# Patient Record
Sex: FEMALE | Race: WHITE | NOT HISPANIC OR LATINO | Employment: STUDENT | URBAN - METROPOLITAN AREA
[De-identification: names, ages, dates, MRNs, and addresses within clinical notes are randomized per-mention and may not be internally consistent; named-entity substitution may affect disease eponyms.]

---
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
## 2023-03-21 DIAGNOSIS — N93.9 ABNORMAL UTERINE BLEEDING (AUB): Primary | ICD-10-CM

## 2023-03-21 RX ORDER — ESCITALOPRAM OXALATE 10 MG/1
10 TABLET ORAL
COMMUNITY
Start: 2023-01-14 | End: 2023-06-15

## 2023-03-21 RX ORDER — FLUOXETINE 10 MG/1
10 CAPSULE ORAL
COMMUNITY
Start: 2022-12-30 | End: 2023-06-15

## 2023-03-21 RX ORDER — NORGESTIMATE AND ETHINYL ESTRADIOL 0.25-0.035
1 KIT ORAL DAILY
Qty: 28 TABLET | Refills: 1 | Status: SHIPPED | OUTPATIENT
Start: 2023-03-21 | End: 2023-06-15 | Stop reason: SDUPTHER

## 2023-03-21 NOTE — TELEPHONE ENCOUNTER
----- Message from Maricruz Altman sent at 3/21/2023  9:16 AM CDT -----  Contact: PT  Type: Needs Medical Advice    Who Called: Zoie/ [facility]  Best Call Back Number: [phone]  Additional  Information: PT requesting a call back to find out why RX SPRINTEC, 28, 0.25-35 mg-mcg per tablet. PT  said pharmacy told her refill was denied by provider. PT is completely out of Medication  Please Advise- Thank you

## 2023-03-21 NOTE — TELEPHONE ENCOUNTER
----- Message from [facility] sent at 3/20/2023  8:39 AM CDT -----  Type:  Needs Medical Advice    Who Called: Pt   Would the patient rather a call back or a response via [facility]? Callback   Best Call Back Number: [phone]  Additional Information: Pt requesting callback from office to discuss issues with prescription

## 2023-03-22 ENCOUNTER — TELEPHONE (OUTPATIENT)
Dept: OBSTETRICS AND GYNECOLOGY | Facility: CLINIC | Age: 18
End: 2023-03-22
Payer: MEDICAID

## 2023-03-27 ENCOUNTER — TELEPHONE (OUTPATIENT)
Dept: OBSTETRICS AND GYNECOLOGY | Facility: CLINIC | Age: 18
End: 2023-03-27
Payer: MEDICAID

## 2023-03-27 NOTE — TELEPHONE ENCOUNTER
----- Message from Mera Raymond sent at 3/27/2023  1:07 PM CDT -----  Type:  Sooner Apoointment Request    Caller is requesting a sooner appointment.  Caller declined first available appointment listed below.  Caller will not accept being placed on the waitlist and is requesting a message be sent to doctor.  Name of Caller:Pt  When is the first available appointment?none    Symptoms:Annual and RX refill    Would the patient rather a call back or a response via [facility]? Call back    Best Call Back Number:[phone]    Additional Information: Sts she needs a call back at this number listed no other to get a sooner appt for her Annual so she can get her refills.  Please advise -- Thank you        
S/w patient. 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  
yes

## 2023-06-15 ENCOUNTER — OFFICE VISIT (OUTPATIENT)
Dept: OBSTETRICS AND GYNECOLOGY | Facility: CLINIC | Age: 18
End: 2023-06-15
Payer: MEDICAID

## 2023-06-15 VITALS — WEIGHT: 139.13 LBS | DIASTOLIC BLOOD PRESSURE: 60 MMHG | SYSTOLIC BLOOD PRESSURE: 108 MMHG

## 2023-06-15 DIAGNOSIS — N93.9 ABNORMAL UTERINE BLEEDING (AUB): ICD-10-CM

## 2023-06-15 DIAGNOSIS — Z30.019 ENCOUNTER FOR FEMALE BIRTH CONTROL: Primary | ICD-10-CM

## 2023-06-15 LAB
B-HCG UR QL: NEGATIVE
CTP QC/QA: YES

## 2023-06-15 PROCEDURE — 3074F SYST BP LT 130 MM HG: CPT | Mod: CPTII,,, | Performed by: OBSTETRICS & GYNECOLOGY

## 2023-06-15 PROCEDURE — 99213 OFFICE O/P EST LOW 20 MIN: CPT | Mod: S$PBB,,, | Performed by: OBSTETRICS & GYNECOLOGY

## 2023-06-15 PROCEDURE — 1160F RVW MEDS BY RX/DR IN RCRD: CPT | Mod: CPTII,,, | Performed by: OBSTETRICS & GYNECOLOGY

## 2023-06-15 PROCEDURE — 3074F PR MOST RECENT SYSTOLIC BLOOD PRESSURE < 130 MM HG: ICD-10-PCS | Mod: CPTII,,, | Performed by: OBSTETRICS & GYNECOLOGY

## 2023-06-15 PROCEDURE — 1160F PR REVIEW ALL MEDS BY PRESCRIBER/CLIN PHARMACIST DOCUMENTED: ICD-10-PCS | Mod: CPTII,,, | Performed by: OBSTETRICS & GYNECOLOGY

## 2023-06-15 PROCEDURE — 3078F PR MOST RECENT DIASTOLIC BLOOD PRESSURE < 80 MM HG: ICD-10-PCS | Mod: CPTII,,, | Performed by: OBSTETRICS & GYNECOLOGY

## 2023-06-15 PROCEDURE — 99999 PR PBB SHADOW E&M-EST. PATIENT-LVL III: CPT | Mod: PBBFAC,,, | Performed by: OBSTETRICS & GYNECOLOGY

## 2023-06-15 PROCEDURE — 3078F DIAST BP <80 MM HG: CPT | Mod: CPTII,,, | Performed by: OBSTETRICS & GYNECOLOGY

## 2023-06-15 PROCEDURE — 99213 PR OFFICE/OUTPT VISIT, EST, LEVL III, 20-29 MIN: ICD-10-PCS | Mod: S$PBB,,, | Performed by: OBSTETRICS & GYNECOLOGY

## 2023-06-15 PROCEDURE — 1159F PR MEDICATION LIST DOCUMENTED IN MEDICAL RECORD: ICD-10-PCS | Mod: CPTII,,, | Performed by: OBSTETRICS & GYNECOLOGY

## 2023-06-15 PROCEDURE — 99999 PR PBB SHADOW E&M-EST. PATIENT-LVL III: ICD-10-PCS | Mod: PBBFAC,,, | Performed by: OBSTETRICS & GYNECOLOGY

## 2023-06-15 PROCEDURE — 81025 URINE PREGNANCY TEST: CPT | Mod: PBBFAC,PN | Performed by: OBSTETRICS & GYNECOLOGY

## 2023-06-15 PROCEDURE — 99213 OFFICE O/P EST LOW 20 MIN: CPT | Mod: PBBFAC,PN | Performed by: OBSTETRICS & GYNECOLOGY

## 2023-06-15 PROCEDURE — 1159F MED LIST DOCD IN RCRD: CPT | Mod: CPTII,,, | Performed by: OBSTETRICS & GYNECOLOGY

## 2023-06-15 RX ORDER — NORGESTIMATE AND ETHINYL ESTRADIOL 0.25-0.035
1 KIT ORAL DAILY
Qty: 84 TABLET | Refills: 3 | Status: SHIPPED | OUTPATIENT
Start: 2023-06-15

## 2023-06-15 RX ORDER — ALBUTEROL SULFATE 90 UG/1
AEROSOL, METERED RESPIRATORY (INHALATION)
COMMUNITY
Start: 2023-05-06

## 2023-06-15 NOTE — PROGRESS NOTES
Chief Complaint   Patient presents with    Contraception     Patient would like a refill of birth control; last prescription was 3 months ago       History of Present Illness   18 y.o. F patient presents today for contraception    Menarche 12  Cycles monthly, lasting 4-5 days, normal, using about 4 pads per day, & no pain  Current Contraception: cocp, needs refill  Prior Contraception: continuous cocp, stopped due to irregular bleeding   Sexually Active: yes  Patient's last menstrual period was 05/12/2023.       Past medical and surgical history reviewed.   I have reviewed the patient's medical history in detail and updated the computerized patient record.  Review of patient's allergies indicates:  No Known Allergies    Review of Systems  Constitutional: negative for chills and fatigue  Gastrointestinal: negative for abdominal pain, constipation, diarrhea, nausea and vomiting  Genitourinary:negative for abnormal menstrual periods, genital lesions and vaginal discharge, dysuria, frequency and hematuria      Physical Examination:  /60   Wt 63.1 kg (139 lb 1.8 oz)   LMP 05/12/2023    Physical Exam:   Constitutional: She appears well-developed and well-nourished. No distress.             Abdominal: Soft. There is no abdominal tenderness.                     Assessment/Plan:  Encounter for female birth control  -     POCT urine pregnancy  -     norgestimate-ethinyl estradioL (SPRINTEC, 28,) 0.25-35 mg-mcg per tablet; Take 1 tablet by mouth once daily.  Dispense: 84 tablet; Refill: 3    Abnormal uterine bleeding (AUB)

## 2024-07-03 ENCOUNTER — OFFICE VISIT (OUTPATIENT)
Dept: OBSTETRICS AND GYNECOLOGY | Facility: CLINIC | Age: 19
End: 2024-07-03
Payer: COMMERCIAL

## 2024-07-03 VITALS
HEIGHT: 63 IN | SYSTOLIC BLOOD PRESSURE: 122 MMHG | DIASTOLIC BLOOD PRESSURE: 70 MMHG | BODY MASS INDEX: 24.14 KG/M2 | WEIGHT: 136.25 LBS

## 2024-07-03 DIAGNOSIS — Z12.4 ENCOUNTER FOR PAPANICOLAOU SMEAR FOR CERVICAL CANCER SCREENING: Primary | ICD-10-CM

## 2024-07-03 DIAGNOSIS — Z30.019 ENCOUNTER FOR FEMALE BIRTH CONTROL: ICD-10-CM

## 2024-07-03 PROCEDURE — 99999 PR PBB SHADOW E&M-EST. PATIENT-LVL III: CPT | Mod: PBBFAC,,,

## 2024-07-03 RX ORDER — MEDROXYPROGESTERONE ACETATE 150 MG/ML
150 INJECTION, SUSPENSION INTRAMUSCULAR
Status: COMPLETED | OUTPATIENT
Start: 2024-07-03 | End: 2024-07-03

## 2024-07-03 RX ORDER — LISDEXAMFETAMINE DIMESYLATE 40 MG/1
CAPSULE ORAL
COMMUNITY

## 2024-07-03 RX ADMIN — MEDROXYPROGESTERONE ACETATE 150 MG: 150 INJECTION, SUSPENSION INTRAMUSCULAR at 03:07

## 2024-07-03 NOTE — PROGRESS NOTES
HISTORY OF PRESENT ILLNESS:    Zoie Brown is a 19 y.o. female, , Patient's last menstrual period was 2024.,  presents for a routine annual exam .   Last pap:  never, first today   Last mammogram: n/a   Last colon ca screening:  n/a   SA: male partner, does not use condoms  Contraception: OCP (estrogen/progesterone). Desires to switch to DMPA, counseled. Will give first injection today and follow up in 3 months.    Sexually transmitted infection risk: very low risk of STD exposure. Declines screening/   Menstrual flow: regular every 28-30 days.  This is the extent of the patient's complaints at this time.     Past Medical History:   Diagnosis Date    Anxiety disorder, unspecified        History reviewed. No pertinent surgical history.    MEDICATIONS AND ALLERGIES:      Current Outpatient Medications:     lisdexamfetamine (VYVANSE) 40 MG Cap, , Disp: , Rfl:     albuterol (PROVENTIL/VENTOLIN HFA) 90 mcg/actuation inhaler, Inhale into the lungs. (Patient not taking: Reported on 7/3/2024), Disp: , Rfl:   No current facility-administered medications for this visit.    Review of patient's allergies indicates:  No Known Allergies    Family History   Problem Relation Name Age of Onset    Breast cancer Neg Hx      Ovarian cancer Neg Hx         Social History     Socioeconomic History    Marital status: Single   Tobacco Use    Smoking status: Never   Substance and Sexual Activity    Alcohol use: Yes     Comment: socially    Drug use: Not Currently    Sexual activity: Yes     Partners: Male     Birth control/protection: OCP       OB History    Para Term  AB Living   0 0 0 0 0 0   SAB IAB Ectopic Multiple Live Births   0 0 0 0 0          COMPREHENSIVE GYN HISTORY:  PAP History: Denies abnormal Paps.  Infection History: Denies STDs. Denies PID.  Benign History: Denies uterine fibroids. Denies ovarian cysts. Denies endometriosis. Denies other conditions.  Cancer History: Denies cervical cancer.  "Denies uterine cancer or hyperplasia. Denies ovarian cancer. Denies vulvar cancer or pre-cancer. Denies vaginal cancer or pre-cancer. Denies breast cancer. Denies colon cancer.      ROS:  GENERAL: No weight changes. No swelling. No fatigue. No fever.  BREASTS: No pain. No lumps. No discharge.  ABDOMEN: No pain. No nausea. No vomiting. No diarrhea. No constipation.  REPRODUCTIVE: No abnormal bleeding. No pelvic pain.   VULVA: No pain. No lesions. No itching.  VAGINA: No relaxation. No itching. No odor. No discharge. No lesions.  URINARY: No incontinence. No nocturia. No frequency. No dysuria.    /70   Ht 5' 3" (1.6 m)   Wt 61.8 kg (136 lb 3.9 oz)   LMP 06/09/2024   BMI 24.13 kg/m²     PE:  Physical Exam:   Constitutional: She is oriented to person, place, and time. She appears well-developed and well-nourished. No distress.    HENT:   Head: Normocephalic.    Eyes: Pupils are equal, round, and reactive to light.      Pulmonary/Chest: Effort normal. No respiratory distress.        Abdominal: Soft. She exhibits no distension. There is no abdominal tenderness.     Genitourinary:    Inguinal canal, vagina, uterus, right adnexa and left adnexa normal.      Pelvic exam was performed with patient supine.   The external female genitalia was normal.     Labial bartholins normal.There is no rash, tenderness or lesion on the right labia. There is no rash, tenderness or lesion on the left labia. Cervix is normal. Right adnexum displays no mass, no tenderness and no fullness. Left adnexum displays no mass, no tenderness and no fullness. Vagina exhibits no lesion. No erythema, vaginal discharge, tenderness or bleeding in the vagina.    No signs of injury in the vagina.   Cervix exhibits no motion tenderness, no lesion, no discharge, no friability, no tenderness and no polyp.    pap smear completedUterus consistancy normal and Uerus contour normal  Uterus is not enlarged and not tender.           Musculoskeletal: Normal " range of motion and moves all extremeties.       Neurological: She is alert and oriented to person, place, and time.    Skin: Skin is warm and dry.    Psychiatric: She has a normal mood and affect. Judgment and thought content normal.      PROCEDURES/ORDERS:  Pap      Assessment/Plan:    Encounter for Papanicolaou smear for cervical cancer screening  -     Liquid-Based Pap Smear, Screening    Encounter for female birth control  -     medroxyPROGESTERone (DEPO-PROVERA) injection 150 mg        COUNSELING:  The patient was counseled today on:  -A.C.S. Pap and pelvic exam guidelines, recomendations for yearly mammogram, monthly self breast exams and to follow up with her PCP for other health maintenance.    FOLLOW-UP  in 3 months for next DMPA and annually for WWE

## 2024-08-31 DIAGNOSIS — Z30.019 ENCOUNTER FOR FEMALE BIRTH CONTROL: ICD-10-CM

## 2024-08-31 RX ORDER — NORGESTIMATE AND ETHINYL ESTRADIOL 0.25-0.035
1 KIT ORAL
Qty: 84 TABLET | Refills: 3 | OUTPATIENT
Start: 2024-08-31

## 2024-08-31 NOTE — TELEPHONE ENCOUNTER
Refill Decision Note   Zoie Brown  is requesting a refill authorization.  Brief Assessment and Rationale for Refill:  Quick Discontinue     Medication Therapy Plan:  discontinued on 7/3/2024 by Marli Ren LPN      Comments:     Note composed:8:47 AM 08/31/2024

## 2024-09-19 ENCOUNTER — PATIENT MESSAGE (OUTPATIENT)
Dept: OBSTETRICS AND GYNECOLOGY | Facility: CLINIC | Age: 19
End: 2024-09-19
Payer: COMMERCIAL

## 2024-09-25 ENCOUNTER — CLINICAL SUPPORT (OUTPATIENT)
Dept: OBSTETRICS AND GYNECOLOGY | Facility: CLINIC | Age: 19
End: 2024-09-25
Payer: COMMERCIAL

## 2024-09-25 DIAGNOSIS — Z30.9 ENCOUNTER FOR CONTRACEPTIVE MANAGEMENT, UNSPECIFIED TYPE: Primary | ICD-10-CM

## 2024-09-25 PROCEDURE — 99999 PR PBB SHADOW E&M-EST. PATIENT-LVL I: CPT | Mod: PBBFAC,,,

## 2024-09-25 PROCEDURE — 96372 THER/PROPH/DIAG INJ SC/IM: CPT | Mod: S$GLB,,, | Performed by: OBSTETRICS & GYNECOLOGY

## 2024-09-25 RX ORDER — MEDROXYPROGESTERONE ACETATE 150 MG/ML
150 INJECTION, SUSPENSION INTRAMUSCULAR
Status: COMPLETED | OUTPATIENT
Start: 2024-09-25 | End: 2024-09-25

## 2024-09-25 RX ADMIN — MEDROXYPROGESTERONE ACETATE 150 MG: 150 INJECTION, SUSPENSION INTRAMUSCULAR at 01:09

## 2024-09-25 NOTE — PROGRESS NOTES
After obtaining consent, and per orders of Dr. Blakely injection of Depo-Provera Lot [id number] Exp 12/31/2026 LUOQG by Sharon Srinivasan MA. Patient tolerated well and band aid applied. Patient instructed to remain in clinic for 15 minutes afterwards, and to report any adverse reaction to me immediately.

## 2024-12-03 ENCOUNTER — PATIENT MESSAGE (OUTPATIENT)
Dept: OBSTETRICS AND GYNECOLOGY | Facility: CLINIC | Age: 19
End: 2024-12-03
Payer: COMMERCIAL

## 2025-03-03 ENCOUNTER — CLINICAL SUPPORT (OUTPATIENT)
Dept: OBSTETRICS AND GYNECOLOGY | Facility: CLINIC | Age: 20
End: 2025-03-03
Payer: COMMERCIAL

## 2025-03-03 DIAGNOSIS — Z30.9 ENCOUNTER FOR CONTRACEPTIVE MANAGEMENT, UNSPECIFIED TYPE: Primary | ICD-10-CM

## 2025-03-03 PROCEDURE — 96372 THER/PROPH/DIAG INJ SC/IM: CPT | Mod: S$GLB,,, | Performed by: OBSTETRICS & GYNECOLOGY

## 2025-03-03 PROCEDURE — 99999 PR PBB SHADOW E&M-EST. PATIENT-LVL I: CPT | Mod: PBBFAC,,,

## 2025-03-03 RX ORDER — MEDROXYPROGESTERONE ACETATE 150 MG/ML
150 INJECTION, SUSPENSION INTRAMUSCULAR
Status: COMPLETED | OUTPATIENT
Start: 2025-03-03 | End: 2025-03-03

## 2025-03-03 RX ADMIN — MEDROXYPROGESTERONE ACETATE 150 MG: 150 INJECTION, SUSPENSION INTRAMUSCULAR at 03:03

## 2025-03-03 NOTE — PROGRESS NOTES
Pt in for on time depo injection which was given as ordered, see MAR.  Pt tolerated well and dates given for next dose.

## 2025-05-21 ENCOUNTER — PATIENT MESSAGE (OUTPATIENT)
Dept: OBSTETRICS AND GYNECOLOGY | Facility: CLINIC | Age: 20
End: 2025-05-21

## 2025-05-23 RX ORDER — NORGESTIMATE AND ETHINYL ESTRADIOL 0.25-0.035
1 KIT ORAL DAILY
Qty: 90 TABLET | Refills: 0 | Status: SHIPPED | OUTPATIENT
Start: 2025-05-23

## 2025-07-07 ENCOUNTER — OFFICE VISIT (OUTPATIENT)
Dept: OBSTETRICS AND GYNECOLOGY | Facility: CLINIC | Age: 20
End: 2025-07-07
Payer: COMMERCIAL

## 2025-07-07 VITALS
WEIGHT: 155.88 LBS | SYSTOLIC BLOOD PRESSURE: 122 MMHG | DIASTOLIC BLOOD PRESSURE: 74 MMHG | BODY MASS INDEX: 27.62 KG/M2 | HEIGHT: 63 IN

## 2025-07-07 DIAGNOSIS — Z12.4 CERVICAL CANCER SCREENING: ICD-10-CM

## 2025-07-07 DIAGNOSIS — Z30.41 ENCOUNTER FOR SURVEILLANCE OF CONTRACEPTIVE PILLS: ICD-10-CM

## 2025-07-07 DIAGNOSIS — Z01.419 WELL WOMAN EXAM WITH ROUTINE GYNECOLOGICAL EXAM: Primary | ICD-10-CM

## 2025-07-07 PROCEDURE — 3078F DIAST BP <80 MM HG: CPT | Mod: CPTII,S$GLB,,

## 2025-07-07 PROCEDURE — 99395 PREV VISIT EST AGE 18-39: CPT | Mod: S$GLB,,,

## 2025-07-07 PROCEDURE — 3008F BODY MASS INDEX DOCD: CPT | Mod: CPTII,S$GLB,,

## 2025-07-07 PROCEDURE — 3074F SYST BP LT 130 MM HG: CPT | Mod: CPTII,S$GLB,,

## 2025-07-07 PROCEDURE — 99999 PR PBB SHADOW E&M-EST. PATIENT-LVL III: CPT | Mod: PBBFAC,,,

## 2025-07-07 PROCEDURE — 88175 CYTOPATH C/V AUTO FLUID REDO: CPT | Mod: TC

## 2025-07-07 RX ORDER — NORGESTIMATE AND ETHINYL ESTRADIOL 0.25-0.035
1 KIT ORAL DAILY
Qty: 90 TABLET | Refills: 3 | Status: SHIPPED | OUTPATIENT
Start: 2025-07-07

## 2025-07-07 NOTE — PROGRESS NOTES
HISTORY OF PRESENT ILLNESS:    Zoie Brown is a 20 y.o. female, , No LMP recorded.,  presents for a routine annual exam .   Last pap:  ***   Gardisil?: {YES/NO:}  Last mammogram: *** NL TC ***%  Last colon ca screening:  ***   SA: {MALE/FEMALE:17439} partner, {does/does not:} use condoms  Contraception: {contraceptive method:5051}.    Sexually transmitted infection risk: {std risk:}.   Menstrual flow: {menses:715}.  This is the extent of the patient's complaints at this time.     Past Medical History:   Diagnosis Date    Anxiety disorder, unspecified        History reviewed. No pertinent surgical history.    MEDICATIONS AND ALLERGIES:    Current Medications[1]    Review of patient's allergies indicates:  No Known Allergies    Family History   Problem Relation Name Age of Onset    Breast cancer Neg Hx      Ovarian cancer Neg Hx         Social History[2]    OB History    Para Term  AB Living   0 0 0 0 0 0   SAB IAB Ectopic Multiple Live Births   0 0 0 0 0          COMPREHENSIVE GYN HISTORY:  PAP History: Denies abnormal Paps.  Infection History: Denies STDs. Denies PID.  Benign History: Denies uterine fibroids. Denies ovarian cysts. Denies endometriosis. Denies other conditions.  Cancer History: Denies cervical cancer. Denies uterine cancer or hyperplasia. Denies ovarian cancer. Denies vulvar cancer or pre-cancer. Denies vaginal cancer or pre-cancer. Denies breast cancer. Denies colon cancer.      ROS:  GENERAL: No weight changes. No swelling. No fatigue. No fever.  BREASTS: No pain. No lumps. No discharge.  ABDOMEN: No pain. No nausea. No vomiting. No diarrhea. No constipation.  REPRODUCTIVE: No abnormal bleeding. No pelvic pain.   VULVA: No pain. No lesions. No itching.  VAGINA: No relaxation. No itching. No odor. No discharge. No lesions.  URINARY: No incontinence. No nocturia. No frequency. No dysuria.    There were no vitals taken for this visit.    PE:  Physical Exam      PROCEDURES/ORDERS:  Pap      Assessment/Plan:    There are no diagnoses linked to this encounter.    COUNSELING:  The patient was counseled today on:  -A.C.S. Pap and pelvic exam guidelines, recomendations for yearly mammogram, monthly self breast exams and to follow up with her PCP for other health maintenance.    FOLLOW-UP  annually for WWE.          [1]   Current Outpatient Medications:     lisdexamfetamine (VYVANSE) 40 MG Cap, , Disp: , Rfl:     norgestimate-ethinyl estradioL (ORTHO-CYCLEN) 0.25-0.035 mg per tablet, Take 1 tablet by mouth once daily., Disp: 90 tablet, Rfl: 0    albuterol (PROVENTIL/VENTOLIN HFA) 90 mcg/actuation inhaler, Inhale into the lungs. (Patient not taking: Reported on 7/3/2024), Disp: , Rfl:   [2]   Social History  Socioeconomic History    Marital status: Single   Tobacco Use    Smoking status: Never   Substance and Sexual Activity    Alcohol use: Yes     Comment: socially    Drug use: Not Currently    Sexual activity: Yes     Partners: Male     Birth control/protection: OCP

## 2025-07-07 NOTE — PROGRESS NOTES
HISTORY OF PRESENT ILLNESS:    Zoie Brown is a 20 y.o. female, , No LMP recorded. (Menstrual status: Birth Control).,  presents for a routine annual exam .   Last pap:  - LGSIL  HPV Vaccine: No, counseled  Last mammogram: n/a   Last colon ca screening:  n/a   SA: 1 male partner, does not use condoms  Contraception: OCP (estrogen/progesterone).    Sexually transmitted infection risk: very low risk of STD exposure. Declines screening/   Menstrual flow: regular every 28-30 days.  This is the extent of the patient's complaints at this time.     Past Medical History:   Diagnosis Date    Anxiety disorder, unspecified        History reviewed. No pertinent surgical history.    MEDICATIONS AND ALLERGIES:      Current Outpatient Medications:     lisdexamfetamine (VYVANSE) 40 MG Cap, , Disp: , Rfl:     albuterol (PROVENTIL/VENTOLIN HFA) 90 mcg/actuation inhaler, Inhale into the lungs. (Patient not taking: Reported on 7/3/2024), Disp: , Rfl:     norgestimate-ethinyl estradioL (ORTHO-CYCLEN) 0.25-0.035 mg per tablet, Take 1 tablet by mouth once daily., Disp: 90 tablet, Rfl: 3    Review of patient's allergies indicates:  No Known Allergies    Family History   Problem Relation Name Age of Onset    Breast cancer Neg Hx      Ovarian cancer Neg Hx         Social History     Socioeconomic History    Marital status: Single   Tobacco Use    Smoking status: Never   Substance and Sexual Activity    Alcohol use: Yes     Comment: socially    Drug use: Not Currently    Sexual activity: Yes     Partners: Male     Birth control/protection: OCP       OB History    Para Term  AB Living   0 0 0 0 0 0   SAB IAB Ectopic Multiple Live Births   0 0 0 0 0          COMPREHENSIVE GYN HISTORY:  PAP History: + abnormal Paps.  Infection History: Denies STDs. Denies PID.  Benign History: Denies uterine fibroids. Denies ovarian cysts. Denies endometriosis. Denies other conditions.  Cancer History: Denies cervical cancer. Denies  "uterine cancer or hyperplasia. Denies ovarian cancer. Denies vulvar cancer or pre-cancer. Denies vaginal cancer or pre-cancer. Denies breast cancer. Denies colon cancer.      ROS:  GENERAL: No weight changes. No swelling. No fatigue. No fever.  BREASTS: No pain. No lumps. No discharge.  ABDOMEN: No pain. No nausea. No vomiting. No diarrhea. No constipation.  REPRODUCTIVE: No abnormal bleeding. No pelvic pain.   VULVA: No pain. No lesions. No itching.  VAGINA: No relaxation. No itching. No odor. No discharge. No lesions.  URINARY: No incontinence. No nocturia. No frequency. No dysuria.    /74   Ht 5' 3" (1.6 m)   Wt 70.7 kg (155 lb 13.8 oz)   BMI 27.61 kg/m²     PE:  Physical Exam:   Constitutional: She is oriented to person, place, and time. She appears well-developed and well-nourished. No distress.    HENT:   Head: Normocephalic.    Eyes: Pupils are equal, round, and reactive to light.      Pulmonary/Chest: Effort normal. No respiratory distress.        Abdominal: Soft. She exhibits no distension. There is no abdominal tenderness.     Genitourinary:    Inguinal canal, vagina, uterus, right adnexa and left adnexa normal.      Pelvic exam was performed with patient supine.   The external female genitalia was normal.     Labial bartholins normal.There is no rash, tenderness or lesion on the right labia. There is no rash, tenderness or lesion on the left labia. Cervix is normal. Right adnexum displays no mass, no tenderness and no fullness. Left adnexum displays no mass, no tenderness and no fullness. Vagina exhibits no lesion. No erythema, vaginal discharge, tenderness or bleeding in the vagina.    No signs of injury in the vagina.   Cervix exhibits no motion tenderness, no lesion, no discharge, no friability, no tenderness and no polyp.    pap smear completedUterus consistancy normal and Uerus contour normal  Uterus is not enlarged and not tender.           Musculoskeletal: Normal range of motion and moves " all extremeties.       Neurological: She is alert and oriented to person, place, and time.    Skin: Skin is warm and dry.    Psychiatric: She has a normal mood and affect. Judgment and thought content normal.        PROCEDURES/ORDERS:  Pap      Assessment/Plan:    Well woman exam with routine gynecological exam  -     Liquid-Based Pap Smear, Screening    Cervical cancer screening  -     Liquid-Based Pap Smear, Screening    Encounter for surveillance of contraceptive pills  -     norgestimate-ethinyl estradioL (ORTHO-CYCLEN) 0.25-0.035 mg per tablet; Take 1 tablet by mouth once daily.  Dispense: 90 tablet; Refill: 3        COUNSELING:  The patient was counseled today on:  -A.C.S. Pap and pelvic exam guidelines, recomendations for yearly mammogram, monthly self breast exams and to follow up with her PCP for other health maintenance.    FOLLOW-UP  in 3 months for next DMPA and annually for WWE

## 2025-07-09 LAB
INSULIN SERPL-ACNC: NORMAL U[IU]/ML
LAB AP BETHESDA CATEGORY: NORMAL
LAB AP CLINICAL FINDINGS: NORMAL
LAB AP CONTRACEPTIVES: NORMAL
LAB AP GYN ADDITIONAL FINDINGS: NORMAL
LAB AP OCHS PAP SPECIMEN ADEQUACY: NORMAL
LAB AP OHS PAP INTERPRETATION: NORMAL
LAB AP PAP DISCLAIMER COMMENTS: NORMAL
LAB AP PAP ESTROGEN REPLACEMENT THERAPY: NORMAL
LAB AP PAP PMP: NORMAL
LAB AP PAP PREVIOUS BX: NORMAL
LAB AP PAP PRIOR TREATMENT: NORMAL
LAB AP PERFORMING LOCATION(S): NORMAL

## 2025-08-26 DIAGNOSIS — Z30.41 ENCOUNTER FOR SURVEILLANCE OF CONTRACEPTIVE PILLS: ICD-10-CM

## 2025-08-26 RX ORDER — NORGESTIMATE AND ETHINYL ESTRADIOL 0.25-0.035
1 KIT ORAL DAILY
Qty: 90 TABLET | Refills: 3 | Status: SHIPPED | OUTPATIENT
Start: 2025-08-26